# Patient Record
Sex: FEMALE | Race: WHITE | NOT HISPANIC OR LATINO | URBAN - METROPOLITAN AREA
[De-identification: names, ages, dates, MRNs, and addresses within clinical notes are randomized per-mention and may not be internally consistent; named-entity substitution may affect disease eponyms.]

---

## 2024-04-30 ENCOUNTER — MEDICAL CORRESPONDENCE (OUTPATIENT)
Dept: HEALTH INFORMATION MANAGEMENT | Facility: CLINIC | Age: 49
End: 2024-04-30

## 2024-05-02 ENCOUNTER — TRANSCRIBE ORDERS (OUTPATIENT)
Dept: OTHER | Age: 49
End: 2024-05-02

## 2024-05-02 DIAGNOSIS — M65.949 FLEXOR TENOSYNOVITIS OF FINGER: Primary | ICD-10-CM

## 2024-05-02 DIAGNOSIS — L03.113 CELLULITIS OF RIGHT UPPER LIMB: ICD-10-CM

## 2024-05-02 DIAGNOSIS — A31.1 MYCOBACTERIUM CHELONAE INFECTION OF SKIN: ICD-10-CM

## 2024-05-03 ENCOUNTER — TELEPHONE (OUTPATIENT)
Dept: INFECTIOUS DISEASES | Facility: CLINIC | Age: 49
End: 2024-05-03

## 2024-05-03 NOTE — TELEPHONE ENCOUNTER
[facility] Call Center    Phone Message    May a detailed message be left on voicemail: yes     Reason for Call: Appointment Intake    Referring Provider Name: Dr. Christina Santa  Diagnosis and/or Symptoms: Flexor tenosynovitis, cellulitis of right upper limb, mycobacterium chelonae infection of skin    Call received from pt to schedule appt, diagnosis requires clinical review. Please advise on scheduling.   Note: Pt would prefer the [facility] location if possible.     Action Taken: Other: Infectious Disease    Travel Screening: Not Applicable

## 2024-05-16 ENCOUNTER — LAB (OUTPATIENT)
Dept: LAB | Facility: CLINIC | Age: 49
End: 2024-05-16
Payer: COMMERCIAL

## 2024-05-16 ENCOUNTER — OFFICE VISIT (OUTPATIENT)
Dept: INFECTIOUS DISEASES | Facility: CLINIC | Age: 49
End: 2024-05-16
Payer: MEDICAID

## 2024-05-16 VITALS
WEIGHT: 136 LBS | OXYGEN SATURATION: 95 % | DIASTOLIC BLOOD PRESSURE: 74 MMHG | HEART RATE: 79 BPM | SYSTOLIC BLOOD PRESSURE: 111 MMHG

## 2024-05-16 DIAGNOSIS — A31.1 MYCOBACTERIUM CHELONAE INFECTION OF SKIN: ICD-10-CM

## 2024-05-16 DIAGNOSIS — A31.1 MYCOBACTERIUM CHELONAE INFECTION OF SKIN: Primary | ICD-10-CM

## 2024-05-16 DIAGNOSIS — L03.113 CELLULITIS OF RIGHT UPPER LIMB: ICD-10-CM

## 2024-05-16 DIAGNOSIS — M65.949 FLEXOR TENOSYNOVITIS OF FINGER: ICD-10-CM

## 2024-05-16 LAB
BASOPHILS # BLD AUTO: 0 10E3/UL (ref 0–0.2)
BASOPHILS NFR BLD AUTO: 1 %
EOSINOPHIL # BLD AUTO: 0.2 10E3/UL (ref 0–0.7)
EOSINOPHIL NFR BLD AUTO: 4 %
ERYTHROCYTE [DISTWIDTH] IN BLOOD BY AUTOMATED COUNT: 12.3 % (ref 10–15)
ERYTHROCYTE [SEDIMENTATION RATE] IN BLOOD BY WESTERGREN METHOD: 14 MM/HR (ref 0–20)
HCT VFR BLD AUTO: 32.9 % (ref 35–47)
HGB BLD-MCNC: 11.4 G/DL (ref 11.7–15.7)
IMM GRANULOCYTES # BLD: 0 10E3/UL
IMM GRANULOCYTES NFR BLD: 0 %
LYMPHOCYTES # BLD AUTO: 1.3 10E3/UL (ref 0.8–5.3)
LYMPHOCYTES NFR BLD AUTO: 24 %
MCH RBC QN AUTO: 31.7 PG (ref 26.5–33)
MCHC RBC AUTO-ENTMCNC: 34.7 G/DL (ref 31.5–36.5)
MCV RBC AUTO: 91 FL (ref 78–100)
MONOCYTES # BLD AUTO: 0.5 10E3/UL (ref 0–1.3)
MONOCYTES NFR BLD AUTO: 9 %
NEUTROPHILS # BLD AUTO: 3.4 10E3/UL (ref 1.6–8.3)
NEUTROPHILS NFR BLD AUTO: 63 %
PLATELET # BLD AUTO: 245 10E3/UL (ref 150–450)
RBC # BLD AUTO: 3.6 10E6/UL (ref 3.8–5.2)
WBC # BLD AUTO: 5.4 10E3/UL (ref 4–11)

## 2024-05-16 PROCEDURE — 85652 RBC SED RATE AUTOMATED: CPT

## 2024-05-16 PROCEDURE — 36415 COLL VENOUS BLD VENIPUNCTURE: CPT

## 2024-05-16 PROCEDURE — G2211 COMPLEX E/M VISIT ADD ON: HCPCS | Performed by: INTERNAL MEDICINE

## 2024-05-16 PROCEDURE — 85025 COMPLETE CBC W/AUTO DIFF WBC: CPT

## 2024-05-16 PROCEDURE — 80053 COMPREHEN METABOLIC PANEL: CPT

## 2024-05-16 PROCEDURE — 99203 OFFICE O/P NEW LOW 30 MIN: CPT | Performed by: INTERNAL MEDICINE

## 2024-05-16 PROCEDURE — 86140 C-REACTIVE PROTEIN: CPT

## 2024-05-16 RX ORDER — MONTELUKAST SODIUM 10 MG/1
10 TABLET ORAL DAILY
COMMUNITY
Start: 2024-02-22

## 2024-05-16 RX ORDER — MELOXICAM 15 MG/1
1 TABLET ORAL DAILY
COMMUNITY
Start: 2024-04-08

## 2024-05-16 RX ORDER — GABAPENTIN 600 MG/1
1 TABLET ORAL 3 TIMES DAILY
COMMUNITY
Start: 2022-11-29

## 2024-05-16 RX ORDER — RIZATRIPTAN BENZOATE 10 MG/1
TABLET ORAL
COMMUNITY
Start: 2022-07-26

## 2024-05-16 RX ORDER — LEVOTHYROXINE SODIUM 25 UG/1
1 TABLET ORAL DAILY
COMMUNITY
Start: 2023-09-05

## 2024-05-16 RX ORDER — CLOBETASOL PROPIONATE 0.5 MG/G
OINTMENT TOPICAL
COMMUNITY
Start: 2024-01-04

## 2024-05-16 RX ORDER — CLOBETASOL PROPIONATE 0.05 G/100ML
SHAMPOO TOPICAL
COMMUNITY
Start: 2024-02-12

## 2024-05-16 RX ORDER — LORAZEPAM 0.5 MG/1
TABLET ORAL
COMMUNITY
Start: 2023-08-30

## 2024-05-16 RX ORDER — CLONIDINE HYDROCHLORIDE 0.1 MG/1
1 TABLET ORAL
COMMUNITY
Start: 2024-03-20

## 2024-05-16 RX ORDER — DULOXETIN HYDROCHLORIDE 60 MG/1
CAPSULE, DELAYED RELEASE ORAL
COMMUNITY
Start: 2024-02-08

## 2024-05-16 RX ORDER — LAMOTRIGINE 100 MG/1
50 TABLET ORAL
COMMUNITY
Start: 2023-04-12

## 2024-05-16 RX ORDER — OXCARBAZEPINE 300 MG/1
300 TABLET, FILM COATED ORAL
COMMUNITY
Start: 2024-03-27

## 2024-05-16 RX ORDER — BUPROPION HYDROCHLORIDE 150 MG/1
1 TABLET ORAL DAILY
COMMUNITY
Start: 2024-03-25

## 2024-05-16 RX ORDER — PHENTERMINE HYDROCHLORIDE 37.5 MG/1
0.5 TABLET ORAL
COMMUNITY
Start: 2024-05-08

## 2024-05-16 RX ORDER — VALACYCLOVIR HYDROCHLORIDE 1 G/1
TABLET, FILM COATED ORAL
COMMUNITY
Start: 2023-12-11

## 2024-05-16 RX ORDER — ERENUMAB-AOOE 70 MG/ML
INJECTION SUBCUTANEOUS
COMMUNITY
Start: 2023-08-28

## 2024-05-16 RX ORDER — FERROUS SULFATE 325(65) MG
325 TABLET ORAL
COMMUNITY
End: 2024-08-08

## 2024-05-16 RX ORDER — ALBUTEROL SULFATE 90 UG/1
1-2 AEROSOL, METERED RESPIRATORY (INHALATION)
COMMUNITY
Start: 2023-12-13

## 2024-05-16 NOTE — LETTER
5/16/2024         RE: Tarsha Toney  [address] [address]        Dear Colleague,    Thank you for referring your patient, Tarsha Toney, to the [facility]. Please see a copy of my visit note below.    [facility] INFECTIOUS DISEASE CLINIC [facility]    Date: 05/16/2024   Patient Name: Tarsha Toney   YOB: 1975  MRN: [id number]      ASSESSMENT:  48-year-old woman with recent infectious flexor tenosynovitis of the right index finger, status post debridement.  Cultures growing Mycobacterium chelonae    Mycobacterium chelonae infection.  Positive surgical cultures from 3/20.  Reportedly, susceptibilities were ordered afterwards.  Concern for persistent infection today due to pain and swelling  Itching.  Has had severe body itching since January.  This is led to cracked skin on her fingers, which probably led to her infection.  Other notable comorbidities: Depression, migraines, previous neck surgeries    PLAN:  -Will follow-up with [facility] micro lab regarding culture susceptibilities for M. Chelonae.  Treatment is generally with multiple agents for 3 to 4 months  -CMP, CBC with differential, CRP, sed rate  -Right hand MRI with contrast  -Referral to orthopedic surgery.  Was seen by hand surgery at [facility], but her insurance does not cover that provider.    I will contact patient once I get susceptibility results to begin treatment.    Return to clinic in 1 month.    Tristan Luo MD  [facility]   Clinic phone: [phone]   Clinic fax: [phone]     ______________________________________________________________________    HISTORY OF PRESENT ILLNESS:   Tarsha Toney is a 48 year old woman who is referred for evaluation of Mycobacterium chelonae hand infection.  The patient developed swelling in her right index finger in February.  This was after an episode of whole body itching of unclear etiology.  She was seen by her  primary doctor who started her on Bactrim.  She was also referred to a hand surgeon.  She was immediately admitted to the hospital after initial evaluation due to severe swelling.  She underwent debridement on 3/20 and was discharged on clindamycin and Bactrim.  Her incision sites have healed well, but she continues to have significant pain in the right index finger.  She denies any fevers.  She also continues to have episodes of itching in different parts of the body.  An E consult was performed at [facility] regarding the positive culture.  At that time, susceptibilities were ordered on the Mycobacterium isolate, but these results are not available at this time.    The patient works as a .  In January she remembers taking care of a house that had 12 cats and was very messy.  As part of her job, she is exposed to many uncleanly environments.      Interval History:  Not applicable      Review of Systems:  Ten systems reviewed and negative except for what is noted in the HPI       Past Medical History:  -Migraines  -Depression  -Chronic neck pain    Past Surgical History:  No past surgical history on file.    Allergies:  Allergies   Allergen Reactions     Lactulose GI Disturbance, Other (See Comments), Hives and Nausea and Vomiting     Cramping    Cramping    - Abdominal Cramping     Cramping     - Abdominal Cramping     Cramping     Penicillins Hives     Atomoxetine Other (See Comments)     Urinary Hesitancy/Retention, Metallic Taste   Urinary Hesitancy/Retention, Metallic Taste   - Urinary Hesitancy/Retention   - Metallic Taste    Urinary Hesitancy/Retention, Metallic Taste    - Urinary Hesitancy/Retention   - Metallic Taste       Medications:    Current Outpatient Medications:      AIMOVIG 70 MG/ML injection, Inject Subcutaneous every 30 days, Disp: , Rfl:      albuterol (PROAIR HFA/PROVENTIL HFA/VENTOLIN HFA) 108 (90 Base) MCG/ACT inhaler, Inhale 1-2 puffs into the lungs, Disp: , Rfl:      buPROPion  (WELLBUTRIN XL) 150 MG 24 hr tablet, Take 1 tablet by mouth daily, Disp: , Rfl:      clobetasol (TEMOVATE) 0.05 % external ointment, , Disp: , Rfl:      clobetasol propionate (CLOBEX) 0.05 % external shampoo, , Disp: , Rfl:      cloNIDine (CATAPRES) 0.1 MG tablet, Take 1 tablet by mouth nightly as needed, Disp: , Rfl:      DULoxetine (CYMBALTA) 60 MG capsule, Take 1 capsule (60 mg) by mouth twice daily., Disp: , Rfl:      gabapentin (NEURONTIN) 600 MG tablet, Take 1 tablet by mouth 3 times daily, Disp: , Rfl:      lamoTRIgine (LAMICTAL) 100 MG tablet, Take 50 mg by mouth, Disp: , Rfl:      levothyroxine (SYNTHROID/LEVOTHROID) 25 MCG tablet, Take 1 tablet by mouth daily, Disp: , Rfl:      LORazepam (ATIVAN) 0.5 MG tablet, , Disp: , Rfl:      meloxicam (MOBIC) 15 MG tablet, Take 1 tablet by mouth daily, Disp: , Rfl:      montelukast (SINGULAIR) 10 MG tablet, Take 10 mg by mouth daily, Disp: , Rfl:      OXcarbazepine (TRILEPTAL) 300 MG tablet, Take 300 mg by mouth, Disp: , Rfl:      phentermine (ADIPEX-P) 37.5 MG tablet, Take 0.5 tablets by mouth two times daily, Disp: , Rfl:      rizatriptan (MAXALT) 10 MG tablet, TAKE 1 TAB AT ONSET OF MIGRAINE. MAY REPEAT DOSE IN 2 HOURS IF NEEDED. DO NOT EXCEED 2 TABS/DAY, 4 TABS/WEEK OR 8 TABS/MONTH, Disp: , Rfl:      ubrogepant (UBRELVY) 100 MG tablet, Take 100 mg by mouth, Disp: , Rfl:      valACYclovir (VALTREX) 1000 mg tablet, Take 1 tablet by mouth daily for 5 days at start of sore., Disp: , Rfl:      biotin 2.5 MG CAPS, , Disp: , Rfl:      ferrous sulfate (FEROSUL) 325 (65 Fe) MG tablet, Take 325 mg by mouth, Disp: , Rfl:      GLUCOSAMINE-CHONDROITIN PO, Take 1 tablet by mouth, Disp: , Rfl:      linaclotide (LINZESS) 72 MCG capsule, Take 72 mcg by mouth, Disp: , Rfl:      MAGNESIUM PO, Take 1 tablet by mouth, Disp: , Rfl:     Social History:  Social History     Socioeconomic History     Marital status: Single     Spouse name: Not on file     Number of children: Not on file      Years of education: Not on file     Highest education level: Not on file   Occupational History     Not on file   Tobacco Use     Smoking status: Not on file     Smokeless tobacco: Not on file   Substance and Sexual Activity     Alcohol use: Not on file     Drug use: Not on file     Sexual activity: Not on file   Other Topics Concern     Not on file   Social History Narrative     Not on file     Social Determinants of Health     Financial Resource Strain: Not on File (11/4/2023)    Received from [facility]     Financial Resource Strain      Financial Resource Strain: 0   Recent Concern: Financial Resource Strain - High Risk (8/25/2023)    Received from [facility]    Overall Financial Resource Strain (CARDIA)      Difficulty of Paying Living Expenses: Very hard   Food Insecurity: Not on File (11/4/2023)    Received from [facility]     Food Insecurity      Food: 0   Transportation Needs: Not on File (11/4/2023)    Received from [facility]     Transportation Needs      Transportation: 0   Physical Activity: Not on File (11/4/2023)    Received from [facility]     Physical Activity      Physical Activity: 0   Recent Concern: Physical Activity - Insufficiently Active (8/25/2023)    Received from [facility]    Exercise Vital Sign      Days of Exercise per Week: 2 days      Minutes of Exercise per Session: 30 min   Stress: Not on File (11/4/2023)    Received from [facility]     Stress      Stress: 0   Social Connections: Not on File (11/4/2023)    Received from [facility]     Social Connections      Social Connections and Isolation: 0   Interpersonal Safety: Unknown (3/20/2024)    Received from [facility]    Humiliation, Afraid, Rape, and Kick questionnaire      Fear of Current or Ex-Partner: Not on file      Emotionally Abused: Not on file      Physically Abused: No      Sexually Abused: No   Housing Stability: Unknown (3/20/2024)    Received from [facility]    Housing Stability Vital Sign      Unable to Pay for Housing in the Last Year:  "No      Number of Places Lived in the Last Year: Not on file      In the last 12 months, was there a time when you did not have a steady place to sleep or slept in a shelter (including now)?: No        Family History:  No family history on file.        PHYSICAL EXAM:    /74   Pulse 79   Wt 61.7 kg (136 lb)   SpO2 95%     GENERAL:  well-developed, well-nourished, in no acute distress.   HENT:  Head is normocephalic, atraumatic.   EYES:  Eyes have anicteric sclerae without conjunctival injection   LUNGS:  normal resp pattern  CARDIOVASCULAR:  Regular rate   MUSCULOSKELETAL: Swelling of the right index finger, without sores or ulcers.  Previous incisions are well-healed.  Very tender to light palpation along the finger and base of the index finger.  Limited range of motion on flexion.  SKIN:  No acute rashes.   NEUROLOGIC: Grossly nonfocal. Normal gait and station        Pertinent labs:    No results found for: \"NA\", \"POTASSIUM\", \"CHLORIDE\", \"CO2\", \"BUN\", \"CR\", \"GLC\"    No results found for: \"WBC\", \"HGB\", \"HCT\", \"PLT\", \"MCV\", \"RDW\"     No results found for: \"BILITOTAL\", \"BILIDIRECT\", \"AST\", \"ALT\", \"PROTTOTAL\", \"ALBUMIN\", \"ALKPHOS\", \"AMYLASE\", \"LIPASE\", \"INR\"         MICROBIOLOGY DATA:    No results found for the last 90 days.       RADIOLOGY:  No results found.     Attestation:  Total time preparing to see this patient, face-to-face time, and coordinating care time on the same calendar date: 41 minutes.  Face-face time: 28 minutes.  Over 50% of face-to-face time was spent in counseling/coordination of care.    The longitudinal plan of care for the diagnosis(es)/condition(s) as documented were addressed during this visit. Due to the added complexity in care, I will continue to support Tarsha in the subsequent management and with ongoing continuity of care.     Again, thank you for allowing me to participate in the care of your patient.        Sincerely,        Tristan Luo MD    "

## 2024-05-16 NOTE — PROGRESS NOTES
[facility] INFECTIOUS DISEASE CLINIC [facility]    Date: 05/16/2024   Patient Name: Tarsha Toney   YOB: 1975  MRN: [id number]      ASSESSMENT:  48-year-old woman with recent infectious flexor tenosynovitis of the right index finger, status post debridement.  Cultures growing Mycobacterium chelonae    Mycobacterium chelonae infection.  Positive surgical cultures from 3/20.  Reportedly, susceptibilities were ordered afterwards.  Concern for persistent infection today due to pain and swelling  Itching.  Has had severe body itching since January.  This is led to cracked skin on her fingers, which probably led to her infection.  Other notable comorbidities: Depression, migraines, previous neck surgeries    PLAN:  -Will follow-up with [facility] micro lab regarding culture susceptibilities for M. Chelonae.  Treatment is generally with multiple agents for 3 to 4 months  -CMP, CBC with differential, CRP, sed rate  -Right hand MRI with contrast  -Referral to orthopedic surgery.  Was seen by hand surgery at [facility], but her insurance does not cover that provider.    I will contact patient once I get susceptibility results to begin treatment.    Return to clinic in 1 month.    Tristan Luo MD  [facility]   Clinic phone: [phone]   Clinic fax: [phone]     ADDENDUM: I spoke with [facility] micro lab. They confirm that susceptibilities were ordered. They will fax results to our clinic ([phone]). Expected time of results in 14-16 days.  Tristan Luo MD 05/17/24   ______________________________________________________________________    HISTORY OF PRESENT ILLNESS:   Tarsha Toney is a 48 year old woman who is referred for evaluation of Mycobacterium chelonae hand infection.  The patient developed swelling in her right index finger in February.  This was after an episode of whole body itching of unclear etiology.  She was seen by her primary doctor who  started her on Bactrim.  She was also referred to a hand surgeon.  She was immediately admitted to the hospital after initial evaluation due to severe swelling.  She underwent debridement on 3/20 and was discharged on clindamycin and Bactrim.  Her incision sites have healed well, but she continues to have significant pain in the right index finger.  She denies any fevers.  She also continues to have episodes of itching in different parts of the body.  An E consult was performed at [facility] regarding the positive culture.  At that time, susceptibilities were ordered on the Mycobacterium isolate, but these results are not available at this time.    The patient works as a .  In January she remembers taking care of a house that had 12 cats and was very messy.  As part of her job, she is exposed to many uncleanly environments.      Interval History:  Not applicable      Review of Systems:  Ten systems reviewed and negative except for what is noted in the HPI       Past Medical History:  -Migraines  -Depression  -Chronic neck pain    Past Surgical History:  No past surgical history on file.    Allergies:  Allergies   Allergen Reactions    Lactulose GI Disturbance, Other (See Comments), Hives and Nausea and Vomiting     Cramping    Cramping    - Abdominal Cramping     Cramping     - Abdominal Cramping     Cramping    Penicillins Hives    Atomoxetine Other (See Comments)     Urinary Hesitancy/Retention, Metallic Taste   Urinary Hesitancy/Retention, Metallic Taste   - Urinary Hesitancy/Retention   - Metallic Taste    Urinary Hesitancy/Retention, Metallic Taste    - Urinary Hesitancy/Retention   - Metallic Taste       Medications:    Current Outpatient Medications:     AIMOVIG 70 MG/ML injection, Inject Subcutaneous every 30 days, Disp: , Rfl:     albuterol (PROAIR HFA/PROVENTIL HFA/VENTOLIN HFA) 108 (90 Base) MCG/ACT inhaler, Inhale 1-2 puffs into the lungs, Disp: , Rfl:     buPROPion (WELLBUTRIN XL) 150 MG 24 hr  tablet, Take 1 tablet by mouth daily, Disp: , Rfl:     clobetasol (TEMOVATE) 0.05 % external ointment, , Disp: , Rfl:     clobetasol propionate (CLOBEX) 0.05 % external shampoo, , Disp: , Rfl:     cloNIDine (CATAPRES) 0.1 MG tablet, Take 1 tablet by mouth nightly as needed, Disp: , Rfl:     DULoxetine (CYMBALTA) 60 MG capsule, Take 1 capsule (60 mg) by mouth twice daily., Disp: , Rfl:     gabapentin (NEURONTIN) 600 MG tablet, Take 1 tablet by mouth 3 times daily, Disp: , Rfl:     lamoTRIgine (LAMICTAL) 100 MG tablet, Take 50 mg by mouth, Disp: , Rfl:     levothyroxine (SYNTHROID/LEVOTHROID) 25 MCG tablet, Take 1 tablet by mouth daily, Disp: , Rfl:     LORazepam (ATIVAN) 0.5 MG tablet, , Disp: , Rfl:     meloxicam (MOBIC) 15 MG tablet, Take 1 tablet by mouth daily, Disp: , Rfl:     montelukast (SINGULAIR) 10 MG tablet, Take 10 mg by mouth daily, Disp: , Rfl:     OXcarbazepine (TRILEPTAL) 300 MG tablet, Take 300 mg by mouth, Disp: , Rfl:     phentermine (ADIPEX-P) 37.5 MG tablet, Take 0.5 tablets by mouth two times daily, Disp: , Rfl:     rizatriptan (MAXALT) 10 MG tablet, TAKE 1 TAB AT ONSET OF MIGRAINE. MAY REPEAT DOSE IN 2 HOURS IF NEEDED. DO NOT EXCEED 2 TABS/DAY, 4 TABS/WEEK OR 8 TABS/MONTH, Disp: , Rfl:     ubrogepant (UBRELVY) 100 MG tablet, Take 100 mg by mouth, Disp: , Rfl:     valACYclovir (VALTREX) 1000 mg tablet, Take 1 tablet by mouth daily for 5 days at start of sore., Disp: , Rfl:     biotin 2.5 MG CAPS, , Disp: , Rfl:     ferrous sulfate (FEROSUL) 325 (65 Fe) MG tablet, Take 325 mg by mouth, Disp: , Rfl:     GLUCOSAMINE-CHONDROITIN PO, Take 1 tablet by mouth, Disp: , Rfl:     linaclotide (LINZESS) 72 MCG capsule, Take 72 mcg by mouth, Disp: , Rfl:     MAGNESIUM PO, Take 1 tablet by mouth, Disp: , Rfl:     Social History:  Social History     Socioeconomic History    Marital status: Single     Spouse name: Not on file    Number of children: Not on file    Years of education: Not on file    Highest  education level: Not on file   Occupational History    Not on file   Tobacco Use    Smoking status: Not on file    Smokeless tobacco: Not on file   Substance and Sexual Activity    Alcohol use: Not on file    Drug use: Not on file    Sexual activity: Not on file   Other Topics Concern    Not on file   Social History Narrative    Not on file     Social Determinants of Health     Financial Resource Strain: Not on File (11/4/2023)    Received from [facility]     Financial Resource Strain     Financial Resource Strain: 0   Recent Concern: Financial Resource Strain - High Risk (8/25/2023)    Received from [facility]    Overall Financial Resource Strain (CARDIA)     Difficulty of Paying Living Expenses: Very hard   Food Insecurity: Not on File (11/4/2023)    Received from [facility]     Food Insecurity     Food: 0   Transportation Needs: Not on File (11/4/2023)    Received from [facility]     Transportation Needs     Transportation: 0   Physical Activity: Not on File (11/4/2023)    Received from [facility]     Physical Activity     Physical Activity: 0   Recent Concern: Physical Activity - Insufficiently Active (8/25/2023)    Received from [facility]    Exercise Vital Sign     Days of Exercise per Week: 2 days     Minutes of Exercise per Session: 30 min   Stress: Not on File (11/4/2023)    Received from [facility]     Stress     Stress: 0   Social Connections: Not on File (11/4/2023)    Received from [facility]     Social Connections     Social Connections and Isolation: 0   Interpersonal Safety: Unknown (3/20/2024)    Received from [facility]    Humiliation, Afraid, Rape, and Kick questionnaire     Fear of Current or Ex-Partner: Not on file     Emotionally Abused: Not on file     Physically Abused: No     Sexually Abused: No   Housing Stability: Unknown (3/20/2024)    Received from [facility]    Housing Stability Vital Sign     Unable to Pay for Housing in the Last Year: No     Number of Places Lived in the Last Year: Not on file     In the  "last 12 months, was there a time when you did not have a steady place to sleep or slept in a shelter (including now)?: No        Family History:  No family history on file.        PHYSICAL EXAM:    /74   Pulse 79   Wt 61.7 kg (136 lb)   SpO2 95%     GENERAL:  well-developed, well-nourished, in no acute distress.   HENT:  Head is normocephalic, atraumatic.   EYES:  Eyes have anicteric sclerae without conjunctival injection   LUNGS:  normal resp pattern  CARDIOVASCULAR:  Regular rate   MUSCULOSKELETAL: Swelling of the right index finger, without sores or ulcers.  Previous incisions are well-healed.  Very tender to light palpation along the finger and base of the index finger.  Limited range of motion on flexion.  SKIN:  No acute rashes.   NEUROLOGIC: Grossly nonfocal. Normal gait and station        Pertinent labs:    No results found for: \"NA\", \"POTASSIUM\", \"CHLORIDE\", \"CO2\", \"BUN\", \"CR\", \"GLC\"    No results found for: \"WBC\", \"HGB\", \"HCT\", \"PLT\", \"MCV\", \"RDW\"     No results found for: \"BILITOTAL\", \"BILIDIRECT\", \"AST\", \"ALT\", \"PROTTOTAL\", \"ALBUMIN\", \"ALKPHOS\", \"AMYLASE\", \"LIPASE\", \"INR\"         MICROBIOLOGY DATA:    No results found for the last 90 days.       RADIOLOGY:  No results found.     Attestation:  Total time preparing to see this patient, face-to-face time, and coordinating care time on the same calendar date: 41 minutes.  Face-face time: 28 minutes.  Over 50% of face-to-face time was spent in counseling/coordination of care.    The longitudinal plan of care for the diagnosis(es)/condition(s) as documented were addressed during this visit. Due to the added complexity in care, I will continue to support Tarsha in the subsequent management and with ongoing continuity of care.   "

## 2024-05-16 NOTE — PATIENT INSTRUCTIONS
Labs today  I'll order an mri and refer you to orthopedics for a hand surgeon    I will call with results and next steps    Return in 1 month

## 2024-05-17 LAB
ALBUMIN SERPL BCG-MCNC: 4.4 G/DL (ref 3.5–5.2)
ALP SERPL-CCNC: 99 U/L (ref 40–150)
ALT SERPL W P-5'-P-CCNC: 14 U/L (ref 0–50)
ANION GAP SERPL CALCULATED.3IONS-SCNC: 11 MMOL/L (ref 7–15)
AST SERPL W P-5'-P-CCNC: 17 U/L (ref 0–45)
BILIRUB SERPL-MCNC: 0.4 MG/DL
BUN SERPL-MCNC: 9.8 MG/DL (ref 6–20)
CALCIUM SERPL-MCNC: 8.7 MG/DL (ref 8.6–10)
CHLORIDE SERPL-SCNC: 93 MMOL/L (ref 98–107)
CREAT SERPL-MCNC: 0.72 MG/DL (ref 0.51–0.95)
CRP SERPL-MCNC: <3 MG/L
DEPRECATED HCO3 PLAS-SCNC: 22 MMOL/L (ref 22–29)
EGFRCR SERPLBLD CKD-EPI 2021: >90 ML/MIN/1.73M2
GLUCOSE SERPL-MCNC: 98 MG/DL (ref 70–99)
POTASSIUM SERPL-SCNC: 4.8 MMOL/L (ref 3.4–5.3)
PROT SERPL-MCNC: 6.6 G/DL (ref 6.4–8.3)
SODIUM SERPL-SCNC: 126 MMOL/L (ref 135–145)

## 2024-05-31 NOTE — PROGRESS NOTES
Culture susceptibility results received.    Isolate is susceptible to: amikacin, ciprofloxacin, clarithromycin, linezolid, moxifloxacin, and TMP/SMX. See below    MRI was ordered, but I do not have results for this yet.    Would start treatment with:  -clarithromycin 500mg po bid  -ciprofloxacin 500mg po bid  -TMP/SMX DS 1 tab bid     Initial plan for 12 weeks, pending MRI results and clinical improvement.    I left a message on patient's VM to review results and recommendations. Will need to confirm her pharmacy.            Susceptibility    Organism Antibiotic Method Susceptibility   Mycobacterium chelonae Performing Laboratory RH LAB SENDOUT MICRO METHOD [facility] mcg/mL   Mycobacterium chelonae Amikacin (IV) RH LAB SENDOUT MICRO METHOD <=8 mcg/mL: Susceptible   Mycobacterium chelonae Amoxicillin/Clavulanic Acid RH LAB SENDOUT MICRO METHOD >32 mcg/mL: No Interpretation    Comment: No CLSI (formerly NCCLS) interpretative guidelines exist for this organism/drug combination. Only the RAPHAEL value is reported in mcg/mL.   Mycobacterium chelonae Azithromycin RH LAB SENDOUT MICRO METHOD <=16 mcg/mL: No Interpretation    Comment: No CLSI (formerly NCCLS) interpretative guidelines exist for this organism/drug combination. Only the RAPHAEL value is reported in mcg/mL.   Mycobacterium chelonae Azithromycin (14 day) RH LAB SENDOUT MICRO METHOD <=16 mcg/mL: No Interpretation    Comment: No CLSI (formerly NCCLS) interpretative guidelines exist for this organism/drug combination. Only the RAPHAEL value is reported in mcg/mL.   Mycobacterium chelonae Cefepime RH LAB SENDOUT MICRO METHOD 32 mcg/mL: No Interpretation    Comment: No CLSI (formerly NCCLS) interpretative guidelines exist for this organism/drug combination. Only the RAPHAEL value is reported in mcg/mL.   Mycobacterium chelonae Cefotaxime RH LAB SENDOUT MICRO METHOD >64 mcg/mL: No Interpretation    Comment: No CLSI (formerly NCCLS) interpretative guidelines exist for  this organism/drug combination. Only the RAPHAEL value is reported in mcg/mL.   Mycobacterium chelonae Cefoxitin RH LAB SENDOUT MICRO METHOD >128 mcg/mL: Resistant   Mycobacterium chelonae Ceftriaxone RH LAB SENDOUT MICRO METHOD >64 mcg/mL: No Interpretation    Comment: No CLSI (formerly NCCLS) interpretative guidelines exist for this organism/drug combination. Only the RAPHAEL value is reported in mcg/mL.   Mycobacterium chelonae Ciprofloxacin RH LAB SENDOUT MICRO METHOD <=1 mcg/mL: Susceptible   Mycobacterium chelonae Clarithromycin RH LAB SENDOUT MICRO METHOD <=0.25 mcg/mL: Susceptible   Mycobacterium chelonae Clarithromycin (14 day) RH LAB SENDOUT MICRO METHOD 1 mcg/mL: Susceptible   Mycobacterium chelonae Clofazimine RH LAB SENDOUT MICRO METHOD <=0.5 mcg/mL: No Interpretation    Comment: No CLSI (formerly NCCLS) interpretative guidelines exist for this organism/drug combination. Only the RAPHAEL value is reported in mcg/mL.   Mycobacterium chelonae Clofazimine/Amikacin Combo RH LAB SENDOUT MICRO METHOD <=0.5 mcg/mL: No Interpretation    Comment: No CLSI (formerly NCCLS) interpretative guidelines exist for this organism/drug combination. Only the RAPHAEL value is reported in mcg/mL.   Mycobacterium chelonae Doxycycline RH LAB SENDOUT MICRO METHOD 16 mcg/mL: Resistant   Mycobacterium chelonae Gentamicin RH LAB SENDOUT MICRO METHOD <=2 mcg/mL: No Interpretation    Comment: No CLSI (formerly NCCLS) interpretative guidelines exist for this organism/drug combination. Only the RAPHAEL value is reported in mcg/mL.   Mycobacterium chelonae Imipenem RH LAB SENDOUT MICRO METHOD 8 mcg/mL: Intermediate   Mycobacterium chelonae Kanamycin RH LAB SENDOUT MICRO METHOD <=8 mcg/mL: No Interpretation    Comment: No CLSI (formerly NCCLS) interpretative guidelines exist for this organism/drug combination. Only the RAPHAEL value is reported in mcg/mL.   Mycobacterium chelonae Linezolid RH LAB SENDOUT MICRO METHOD 2 mcg/mL: Susceptible   Mycobacterium  chelonae Minocycline RH LAB SENDOUT MICRO METHOD 2 mcg/mL: No Interpretation    Comment: No CLSI (formerly NCCLS) interpretative guidelines exist for this organism/drug combination. Only the RAPHAEL value is reported in mcg/mL.   Mycobacterium chelonae Moxifloxacin RH LAB SENDOUT MICRO METHOD <=0.5 mcg/mL: Susceptible   Mycobacterium chelonae Tigecycline RH LAB SENDOUT MICRO METHOD <=0.254 mcg/mL: No Interpretation    Comment: No CLSI (formerly NCCLS) interpretative guidelines exist for this organism/drug combination. Only the RAPHAEL value is reported in mcg/mL.   Mycobacterium chelonae Tobramycin RH LAB SENDOUT MICRO METHOD <=2 mcg/mL: No Interpretation    Comment: No CLSI (formerly NCCLS) interpretative guidelines exist for this organism/drug combination. Only the RAPHAEL value is reported in mcg/mL.   Mycobacterium chelonae Trimethoprim/Sulfamethoxazole RH LAB SENDOUT MICRO METHOD 2 mcg/mL: Susceptible   Comment: Azithromycin Susceptibility: The 3-day incubation result cannot be used to assess the presence of acquired macrolide resistance.    Azithromycin Susceptibility (14 days): A 14 days incubation is performed to assess the presence of inducible resistance.      Ciprofloxacin susceptibility: Ciprofloxacin and levofloxacin are interchangeable. Both are less active in vitro the new 8-methoxy fluoroquinolones.    Clarithromycin susceptibility: This is the class representative for macrolides (e.g. azithromycin). The 3-day incubation result cannot be used to assess the presence of acquired macrolide resistance.    Clarithromycin Susceptibility (14 days): A 14 days incubation is performed to assess the presence of inducible resistance.    Clofazimine/Amikacin susceptibility: The RAPHAEL of clofazimine in the presence of 2.0 mcg/mL of amikacin is less than or equal to 0.5 mcg/mL.    Imipenem susceptibility: All isolates of M. fortuitum, M. smegmatis, and M. mucogenicum groups are presumed susceptible to imipenem based on clinical  response data.    Tobramycin susceptibility: Used predominantly for treating M. chelonae infections    Testing was performed by the broth dilution microdilution method unless otherwise stated above. This assay is a laboratory developed test used for clinical purposes. It was developed and its performance characteristics determined by advanced diagnostic laboratories at [facility]. It has not been cleared or approved by the U.S. Food and Drug Administration (FDA). The FDA has determined that such clearance or approval is not necessary.    This laboratory is certified under the Clinical Laboratory Improvement Amendments of 1988 (CLIA-88) and accredited by the College of American Pathologists as qualified to perform high complexity clinical laboratory testing.    Testing performed by:    [facility]  [facility] [address] [place] [address]

## 2024-06-03 ENCOUNTER — TELEPHONE (OUTPATIENT)
Dept: INFECTIOUS DISEASES | Facility: CLINIC | Age: 49
End: 2024-06-03
Payer: COMMERCIAL

## 2024-06-03 ENCOUNTER — TRANSFERRED RECORDS (OUTPATIENT)
Dept: HEALTH INFORMATION MANAGEMENT | Facility: CLINIC | Age: 49
End: 2024-06-03
Payer: COMMERCIAL

## 2024-06-03 DIAGNOSIS — A31.1 MYCOBACTERIUM CHELONAE INFECTION OF SKIN: Primary | ICD-10-CM

## 2024-06-03 RX ORDER — CIPROFLOXACIN 500 MG/1
500 TABLET, FILM COATED ORAL 2 TIMES DAILY
Qty: 180 TABLET | Refills: 0 | Status: SHIPPED | OUTPATIENT
Start: 2024-06-03 | End: 2024-08-08

## 2024-06-03 RX ORDER — SULFAMETHOXAZOLE/TRIMETHOPRIM 800-160 MG
1 TABLET ORAL 2 TIMES DAILY
Qty: 180 TABLET | Refills: 0 | Status: SHIPPED | OUTPATIENT
Start: 2024-06-03 | End: 2024-08-08

## 2024-06-03 RX ORDER — CLARITHROMYCIN 500 MG
500 TABLET ORAL 2 TIMES DAILY
Qty: 180 TABLET | Refills: 0 | Status: SHIPPED | OUTPATIENT
Start: 2024-06-03 | End: 2024-08-08

## 2024-06-03 NOTE — TELEPHONE ENCOUNTER
I called the pt and discussed the MD note. The pt had her MRI today, results are available in care every where. Please review and advise on next steps. RX's pended, please review, as there are drug-drug interactions.

## 2024-06-03 NOTE — TELEPHONE ENCOUNTER
Prescriptions sent to pharmacy.    I called patient. Clarithromycin will interact with Ubrelvy. This is an as needed medicine. She will not take this while on the clarithromycin.    MRI results reviewed. No osteomyelitis seen.    Ortho had referral is still recommended.    Clarithro 500 bid  Ciprofloxacin 500mg po bid  TMP/SMX DS po bid    Follow-up planned in 2 weeks

## 2024-06-03 NOTE — TELEPHONE ENCOUNTER
[facility] Call Center    Phone Message    May a detailed message be left on voicemail: yes     Reason for Call: Pt reports she had received a voicemail on Friday (5/31/24) to call Dr. Rosario's office back-she believes it may have been in regards to her recent lab results. Would like a call back from one of his nurses when possible?     Action Taken: Other: Infectious Disease    Travel Screening: Not Applicable

## 2024-06-16 ENCOUNTER — HEALTH MAINTENANCE LETTER (OUTPATIENT)
Age: 49
End: 2024-06-16

## 2024-06-21 ENCOUNTER — DOCUMENTATION ONLY (OUTPATIENT)
Dept: INFECTIOUS DISEASES | Facility: CLINIC | Age: 49
End: 2024-06-21
Payer: COMMERCIAL

## 2024-06-21 NOTE — PROGRESS NOTES
Patient's appointment was cancelled yesterday.    I called her today to review her medications.    She is taking TMP/SMX bid without issues. It has helped with her itching.    She has not started a second medication yet. She recalls that one of the medication gave her side effects in the past, but cannot remember which one. She describes these as bloating, constipation, and nausea.    She will look at her personal records and see which one caused symptoms in the past. Based on this she will start either clarithromycin or ciprofloxacin in a few days.    If symptoms return she will stop one and start the other. If no symptoms, she will continue 2 medications (TMP/SMX + 2nd drug) until our next visit.    If she cannot tolerate either one, she will call clinic. Based on previous cultures, azithromycin, moxifloxacin, or linezolid would be options for a 2nd agent. I would consider azithromycin 250mg daily a first option and increase to 500mg daily if tolerable.     She has seen a hand surgeon, and finger is looking okay and does not need surgery. Finger is still painful.

## 2024-07-12 ENCOUNTER — TELEPHONE (OUTPATIENT)
Dept: INFECTIOUS DISEASES | Facility: CLINIC | Age: 49
End: 2024-07-12
Payer: COMMERCIAL

## 2024-07-12 NOTE — TELEPHONE ENCOUNTER
[facility] Call Center    Phone Message    May a detailed message be left on voicemail: yes     Reason for Call: Symptoms or Concerns     Current symptom or concern: Patient reports she's been taking sulfamethoxazole-trimethoprim (BACTRIM DS) 800-160 MG tablet for her infection. She states that she forgot that to limit sun exposure and has now developed an allergic rx. She endorses hives on her neck, and under her arms. She states she's taken Benadryl which only makes her drowsy and she's tried Eucerin Anti-itch for eczema after she showers which has helped. Pt states she stopped the medication for now. Please advise    Symptoms have been present for:  3 day(s)    Has patient previously been seen for this? No    Are there any new or worsening symptoms? Yes: itching, swollen feet    Please call pt back to discuss her symptoms further.    Action Taken: Other: ID    Travel Screening: Not Applicable     Date of Service:

## 2024-07-15 NOTE — TELEPHONE ENCOUNTER
[facility] Call Center    Phone Message    May a detailed message be left on voicemail: yes     Reason for Call: Other: Pt call back regarding missed call. Per pt she would like for someone to reach out ASAP due to pt being in a lot of pain. Please Follow-up ASAP. Thank you      Action Taken: Other: ID    Travel Screening: Not Applicable     Date of Service:

## 2024-07-16 NOTE — TELEPHONE ENCOUNTER
"Pt called back, her rash is improved. I informed that she can go back on the bactrim, however she says she cannot be on this, as she cannot stay out of the sun. The pt also reports that she is not taking the clarithromycin because it \"hurt my stomach so bad\". Please advise on alternatives. Pt is taking cipro.  "

## 2024-07-18 NOTE — TELEPHONE ENCOUNTER
Per the MD;    If she cannot tolerate oral antibiotics, she will need to be admitted to start IV antibiotics    I called and informed the pt of the above. Pt understands.

## 2024-07-19 NOTE — TELEPHONE ENCOUNTER
Pt is calling to speak to nurse to talk to her about the IV antibiotic. Pt states she is to go to her local hospital for this. Pt wants to make sure that the hospital knows what to do. Pt would like a call back today if possible at [phone]. Ok to leave VM and/or a [employer] message.

## 2024-07-25 ENCOUNTER — TELEPHONE (OUTPATIENT)
Dept: INFECTIOUS DISEASES | Facility: CLINIC | Age: 49
End: 2024-07-25
Payer: COMMERCIAL

## 2024-07-25 DIAGNOSIS — A31.1 MYCOBACTERIUM CHELONAE INFECTION OF SKIN: Primary | ICD-10-CM

## 2024-07-25 NOTE — TELEPHONE ENCOUNTER
" [facility] Call Center    Phone Message    May a detailed message be left on voicemail: yes     Reason for Call: Other: Per pt requesting a call back from Dr. Luo himself. Per pt request did not want to talk to nurse. Pt did not give much detail on why, just stated she has a complaint. Pt is schedule for 08/08/2024 for video visit. Writer did let pt she needs to be in state of [place] when conducting video. Pt asked\" how would they know if I'm in [place]?\" Writer tried offer to reschedule to in person visit pt did not want to.       Action Taken: Other: ID    Travel Screening: Not Applicable     Date of Service:                                                                     "

## 2024-07-29 ENCOUNTER — MYC REFILL (OUTPATIENT)
Dept: INFECTIOUS DISEASES | Facility: CLINIC | Age: 49
End: 2024-07-29
Payer: COMMERCIAL

## 2024-07-29 DIAGNOSIS — A31.1 MYCOBACTERIUM CHELONAE INFECTION OF SKIN: ICD-10-CM

## 2024-07-29 RX ORDER — CIPROFLOXACIN 500 MG/1
500 TABLET, FILM COATED ORAL 2 TIMES DAILY
Qty: 180 TABLET | Refills: 0 | OUTPATIENT
Start: 2024-07-29

## 2024-08-08 ENCOUNTER — VIRTUAL VISIT (OUTPATIENT)
Dept: INFECTIOUS DISEASES | Facility: CLINIC | Age: 49
End: 2024-08-08
Attending: INTERNAL MEDICINE
Payer: COMMERCIAL

## 2024-08-08 DIAGNOSIS — A31.1 MYCOBACTERIUM CHELONAE INFECTION OF SKIN: ICD-10-CM

## 2024-08-08 PROCEDURE — G2211 COMPLEX E/M VISIT ADD ON: HCPCS | Mod: 95 | Performed by: INTERNAL MEDICINE

## 2024-08-08 PROCEDURE — 99214 OFFICE O/P EST MOD 30 MIN: CPT | Mod: 95 | Performed by: INTERNAL MEDICINE

## 2024-08-08 RX ORDER — AZITHROMYCIN 250 MG/1
500 TABLET, FILM COATED ORAL DAILY
Qty: 84 TABLET | Refills: 0 | Status: SHIPPED | OUTPATIENT
Start: 2024-08-08 | End: 2024-08-26

## 2024-08-08 RX ORDER — CIPROFLOXACIN 500 MG/1
500 TABLET, FILM COATED ORAL 2 TIMES DAILY
Qty: 84 TABLET | Refills: 0 | Status: SHIPPED | OUTPATIENT
Start: 2024-08-08 | End: 2024-09-19

## 2024-08-08 ASSESSMENT — PATIENT HEALTH QUESTIONNAIRE - PHQ9: SUM OF ALL RESPONSES TO PHQ QUESTIONS 1-9: 17

## 2024-08-08 NOTE — PROGRESS NOTES
Recommend direct brow lift with laser buff through insurance (discussed risks and benefits of sx. .. ). [facility] INFECTIOUS DISEASE CLINIC [facility]    Date: 08/08/2024   Patient Name: Tarsha Toney   YOB: 1975  MRN: [id number]      ASSESSMENT:  48-year-old woman with recent infectious flexor tenosynovitis of the right index finger, status post debridement.  Cultures growing Mycobacterium chelonae    Mycobacterium chelonae infection.  Positive surgical cultures from 3/20.  Reportedly, susceptibilities were ordered afterwards.  Concern for persistent infection today due to pain and swelling. MRI without signs of osteomyelitis. Isolate is susceptible to: amikacin, ciprofloxacin, clarithromycin, linezolid, moxifloxacin, and TMP/SMX. Patient had hives to TMP/SMX and did not tolerate clarithromycin. Currently on iv azithromycin and po ciprofloxacin   Other notable comorbidities: Depression, migraines, previous neck surgeries    PLAN:  -continue po ciprofloxacin  -start azithromycin 500mg po daily. If she tolerates this, then would stop iv infusion  -will plan on 2-months of 2-drug therapy and monitor clinical improvement  -repeat MRI if there are signs of recurrence    Return to clinic in 1 month.    Tristan Luo MD  [facility]   Clinic phone: [phone]   Clinic fax: [phone]     ______________________________________________________________________    HISTORY OF PRESENT ILLNESS:   From initial visit:  Tarsha Toney is a 48 year old woman who is referred for evaluation of Mycobacterium chelonae hand infection.  The patient developed swelling in her right index finger in February.  This was after an episode of whole body itching of unclear etiology.  She was seen by her primary doctor who started her on Bactrim.  She was also referred to a hand surgeon.  She was immediately admitted to the hospital after initial evaluation due to severe swelling.  She underwent debridement on 3/20 and was discharged on clindamycin and Bactrim.  Her incision sites have healed well, but  she continues to have significant pain in the right index finger.  She denies any fevers.  She also continues to have episodes of itching in different parts of the body.  An E consult was performed at [facility] regarding the positive culture.  At that time, susceptibilities were ordered on the Mycobacterium isolate, but these results are not available at this time.    The patient works as a .  In January she remembers taking care of a house that had 12 cats and was very messy.  As part of her job, she is exposed to many uncleanly environments.      Interval History:  Patient is seen by virtual visit. She was started on iv azithromycin via her PCP. She tolerates this. She is also on ciprofloxacin po which she tolerates. Her finger feels back to normal. No pain or swelling.     Recent labs with hyponatremia.  Thought to be related to her migraine medication, followed by her PCP.        Past Medical History:  -Migraines  -Depression  -Chronic neck pain    Past Surgical History:  No past surgical history on file.    Allergies:  Allergies   Allergen Reactions    Lactulose GI Disturbance, Other (See Comments), Hives and Nausea and Vomiting     Cramping    Cramping    - Abdominal Cramping     Cramping     - Abdominal Cramping     Cramping    Penicillins Hives    Sulfamethoxazole-Trimethoprim Hives, Itching and Rash    Sulfa Antibiotics Hives    Atomoxetine Other (See Comments)     Urinary Hesitancy/Retention, Metallic Taste   Urinary Hesitancy/Retention, Metallic Taste   - Urinary Hesitancy/Retention   - Metallic Taste    Urinary Hesitancy/Retention, Metallic Taste    - Urinary Hesitancy/Retention   - Metallic Taste       Medications:    Current Outpatient Medications:     AIMOVIG 70 MG/ML injection, Inject Subcutaneous every 30 days, Disp: , Rfl:     albuterol (PROAIR HFA/PROVENTIL HFA/VENTOLIN HFA) 108 (90 Base) MCG/ACT inhaler, Inhale 1-2 puffs into the lungs, Disp: , Rfl:     biotin 2.5 MG CAPS, , Disp: , Rfl:      buPROPion (WELLBUTRIN XL) 150 MG 24 hr tablet, Take 1 tablet by mouth daily, Disp: , Rfl:     ciprofloxacin (CIPRO) 500 MG tablet, Take 1 tablet (500 mg) by mouth 2 times daily for 90 days, Disp: 180 tablet, Rfl: 0    clobetasol (TEMOVATE) 0.05 % external ointment, , Disp: , Rfl:     clobetasol propionate (CLOBEX) 0.05 % external shampoo, , Disp: , Rfl:     cloNIDine (CATAPRES) 0.1 MG tablet, Take 1 tablet by mouth nightly as needed, Disp: , Rfl:     DULoxetine (CYMBALTA) 60 MG capsule, Take 1 capsule (60 mg) by mouth twice daily., Disp: , Rfl:     gabapentin (NEURONTIN) 600 MG tablet, Take 1 tablet by mouth 3 times daily, Disp: , Rfl:     GLUCOSAMINE-CHONDROITIN PO, Take 1 tablet by mouth, Disp: , Rfl:     lamoTRIgine (LAMICTAL) 100 MG tablet, Take 50 mg by mouth, Disp: , Rfl:     levothyroxine (SYNTHROID/LEVOTHROID) 25 MCG tablet, Take 1 tablet by mouth daily, Disp: , Rfl:     LORazepam (ATIVAN) 0.5 MG tablet, , Disp: , Rfl:     MAGNESIUM PO, Take 1 tablet by mouth, Disp: , Rfl:     meloxicam (MOBIC) 15 MG tablet, Take 1 tablet by mouth daily, Disp: , Rfl:     montelukast (SINGULAIR) 10 MG tablet, Take 10 mg by mouth daily, Disp: , Rfl:     OXcarbazepine (TRILEPTAL) 300 MG tablet, Take 300 mg by mouth, Disp: , Rfl:     phentermine (ADIPEX-P) 37.5 MG tablet, Take 0.5 tablets by mouth two times daily, Disp: , Rfl:     rizatriptan (MAXALT) 10 MG tablet, TAKE 1 TAB AT ONSET OF MIGRAINE. MAY REPEAT DOSE IN 2 HOURS IF NEEDED. DO NOT EXCEED 2 TABS/DAY, 4 TABS/WEEK OR 8 TABS/MONTH, Disp: , Rfl:     ubrogepant (UBRELVY) 100 MG tablet, Take 100 mg by mouth, Disp: , Rfl:     valACYclovir (VALTREX) 1000 mg tablet, Take 1 tablet by mouth daily for 5 days at start of sore., Disp: , Rfl:     Social History:  Social History     Socioeconomic History    Marital status: Single     Spouse name: Not on file    Number of children: Not on file    Years of education: Not on file    Highest education level: Not on file   Occupational  History    Not on file   Tobacco Use    Smoking status: Not on file    Smokeless tobacco: Not on file   Substance and Sexual Activity    Alcohol use: Not on file    Drug use: Not on file    Sexual activity: Not on file   Other Topics Concern    Not on file   Social History Narrative    Not on file     Social Determinants of Health     Financial Resource Strain: Not on File (2024)    Received from [facility]    Financial Resource Strain     Financial Resource Strain: 0   Food Insecurity: Not on File (2024)    Received from [facility]    Food Insecurity     Food: 0   Transportation Needs: Not on File (2024)    Received from [facility]    Transportation Needs     Transportation: 0   Physical Activity: Not on File (2024)    Received from [facility]    Physical Activity     Physical Activity: 0   Stress: Not on File (2024)    Received from [facility]    Stress     Stress: 0   Social Connections: Not on File (2024)    Received from [facility]    Social Connections     Social Connections and Isolation: 0   Interpersonal Safety: Unknown (2024)    Received from [facility]    Humiliation, Afraid, Rape, and Kick questionnaire     Fear of Current or Ex-Partner: Not on file     Emotionally Abused: No     Physically Abused: No     Sexually Abused: No   Housing Stability: Not on File (2024)    Received from [facility]    Housing Stability     Housin        Family History:  No family history on file.        PHYSICAL EXAM:    There were no vitals taken for this visit.    GENERAL: alert and no distress  EYES: Eyes grossly normal to inspection.  No discharge or erythema, or obvious scleral/conjunctival abnormalities.  RESP: No audible wheeze, cough, or visible cyanosis.    SKIN: Visible skin clear. No significant rash, abnormal pigmentation or lesions.  NEURO: Cranial nerves grossly intact.  Mentation and speech appropriate for age.  PSYCH: Appropriate affect, tone, and pace of words  Right index finger without  swelling      Pertinent labs:    Outside labs reviewed  8/2 WBC 11.5, hemoglobin 10.1, platelets 201, sodium 125, creatinine 0.62, bilirubin and transaminases normal, CRP 0.1 mg/dL    Lab Results   Component Value Date     (L) 05/16/2024    POTASSIUM 4.8 05/16/2024    CHLORIDE 93 (L) 05/16/2024    CO2 22 05/16/2024    BUN 9.8 05/16/2024    CR 0.72 05/16/2024    GLC 98 05/16/2024       Lab Results   Component Value Date    WBC 5.4 05/16/2024    HGB 11.4 (L) 05/16/2024    HCT 32.9 (L) 05/16/2024     05/16/2024    MCV 91 05/16/2024    RDW 12.3 05/16/2024        Lab Results   Component Value Date    BILITOTAL 0.4 05/16/2024    AST 17 05/16/2024    ALT 14 05/16/2024    PROTTOTAL 6.6 05/16/2024    ALBUMIN 4.4 05/16/2024    ALKPHOS 99 05/16/2024            MICROBIOLOGY DATA:    No results found for the last 90 days.       RADIOLOGY:  US Upper Extremity Venous Duplex Right    Result Date: 8/2/2024  EXAM: US VENOUS RIGHT UPPER EXTREM DOPPLER LOCATION: [facility] DATE: 8/2/2024 INDICATION: Right arm pain and swelling. COMPARISON: None. TECHNIQUE: Venous Duplex ultrasound of the right upper extremity with (when possible) and without compression, augmentation, and duplex. Color flow and spectral Doppler with waveform analysis performed. FINDINGS: Ultrasound includes evaluation of the internal jugular vein, innominate vein, subclavian vein, axillary vein, brachial vein, and contralateral subclavian vein. The superficial cephalic and basilic veins were also evaluated where seen. RIGHT: No deep venous thrombosis. No superficial thrombophlebitis. Right upper extremity PICC with no pericatheter thrombus. Scan at the site of right wrist bruising demonstrates no focal fluid collection nor significant hematoma. IMPRESSION: 1.  Right upper extremity negative for acute DVT.    XR Chest 2 Views    Result Date: 7/27/2024  EXAM: XR CHEST 2 VIEWS LOCATION: [facility] DATE: 7/27/2024 INDICATION: Chest pain COMPARISON:  7/23/2024 IMPRESSION: No acute cardiopulmonary abnormality. Right upper extremity PICC tip at the SVC-RA junction.    XR Chest Port 1 View    Result Date: 7/23/2024  EXAM: XR PORTABLE CHEST 1 VIEW LOCATION: [facility] DATE: 7/23/2024 INDICATION: PICC LINE, LINE/TUBE PLACEMENT COMPARISON: 12/4/2019. IMPRESSION: A new right PICC is seen deep in the right ventricle. Recommend withdrawing 5 cm. The lungs are clear. The heart is normal in size. No pulmonary vascular congestion. Partially seen cervical fusion hardware.    US Anesthesia Vascular Access    Result Date: 7/23/2024  If an Anesthesia block was performed please see the Anesthesia encounter for documentation.  This procedure was performed and interpreted by the performing provider.      XR Peripheral Other Nerve Block Inj    Result Date: 7/23/2024  If an Anesthesia block was performed please see the Anesthesia encounter for documentation.  This procedure was performed and interpreted by the performing provider.          Virtual Visit Details    Type of service:  Video Visit   Video Start Time:  9:25am  Video End Time: 9:40am    Originating Location (pt. Location): Other Infusion center    Distant Location (provider location):  On-site  Platform used for Video Visit: [employer]      Total time preparing to see this patient, face-to-face time, and coordinating care time on the same calendar date:25 minutes.  Face-face time: 15 minutes.  Over 50% of face-to-face time was spent in counseling/coordination of care.     The longitudinal plan of care for the diagnosis(es)/condition(s) as documented were addressed during this visit. Due to the added complexity in care, I will continue to support Tarsha in the subsequent management and with ongoing continuity of care.

## 2024-08-26 DIAGNOSIS — A31.1 MYCOBACTERIUM CHELONAE INFECTION OF SKIN: ICD-10-CM

## 2024-09-03 RX ORDER — AZITHROMYCIN 250 MG/1
500 TABLET, FILM COATED ORAL DAILY
Qty: 84 TABLET | Refills: 0 | Status: SHIPPED | OUTPATIENT
Start: 2024-09-03

## 2025-06-21 ENCOUNTER — HEALTH MAINTENANCE LETTER (OUTPATIENT)
Age: 50
End: 2025-06-21